# Patient Record
Sex: MALE | Race: WHITE
[De-identification: names, ages, dates, MRNs, and addresses within clinical notes are randomized per-mention and may not be internally consistent; named-entity substitution may affect disease eponyms.]

---

## 2020-06-14 ENCOUNTER — HOSPITAL ENCOUNTER (EMERGENCY)
Dept: HOSPITAL 72 - EMR | Age: 30
Discharge: HOME | End: 2020-06-14
Payer: COMMERCIAL

## 2020-06-14 VITALS — WEIGHT: 150 LBS | BODY MASS INDEX: 21.47 KG/M2 | HEIGHT: 70 IN

## 2020-06-14 VITALS — DIASTOLIC BLOOD PRESSURE: 68 MMHG | SYSTOLIC BLOOD PRESSURE: 106 MMHG

## 2020-06-14 DIAGNOSIS — S61.511A: Primary | ICD-10-CM
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
## 2020-06-14 DIAGNOSIS — Z23: ICD-10-CM

## 2020-06-14 PROCEDURE — 90715 TDAP VACCINE 7 YRS/> IM: CPT

## 2020-06-14 PROCEDURE — 90471 IMMUNIZATION ADMIN: CPT

## 2020-06-14 PROCEDURE — 99283 EMERGENCY DEPT VISIT LOW MDM: CPT

## 2020-06-14 NOTE — NUR
ER DISCHARGE NOTE:

Patient is cleared to be discharged home per ERMD, pt is aox4, 99% on room air, 
with stable vital signs. pt was given dc and prescription instructions, pt was 
able to verbalize understanding, pt id band removed. pt is able to ambulate 
with steady gait. pt took all belongings.

## 2020-06-14 NOTE — NUR
ED Nurse Note:



All medications administered, pt tolerated well. NO ss of distress noted. Wound 
cleaned and bandaged. Will continue to monitor.

## 2020-06-14 NOTE — EMERGENCY ROOM REPORT
History of Present Illness


General


Chief Complaint:  Laceration


Source:  Patient





Present Illness


HPI


Patient is a 29-year-old male denies any significant past medical history who 

presents to the ER complaining of laceration to his right wrist.  Patient is 

left-hand dominant.  He states that he was holding a mug that was cut and 

accidentally cut his wrist.  He states that it was not suicidal ideation or 

attempt.  He does not know when his last tetanus shot was.


Allergies:  


Coded Allergies:  


     No Known Allergies (Unverified , 6/14/20)





COVID-19 Screening


Contact w/high risk pt:  No


Recent Travel to affected area:  No


Experienced COVID-19 symptoms?:  No


COVID-19 Testing performed PTA:  No





Patient History


Reviewed Nursing Documentation:  PMH: Agreed; PSxH: Agreed





Review of Systems


All Other Systems:  negative except mentioned in HPI





Physical Exam





Vital Signs








  Date Time  Temp Pulse Resp B/P (MAP) Pulse Ox O2 Delivery O2 Flow Rate FiO2


 


6/14/20 22:12 98.2 68 16 106/68 (81) 97 Room Air  








Sp02 EP Interpretation:  reviewed, normal


General Appearance:  no apparent distress, alert, GCS 15, non-toxic


Head:  normocephalic, atraumatic


Eyes:  bilateral eye normal inspection, bilateral eye PERRL


ENT:  hearing grossly normal, EOM grossly intact, normal pharynx, no angioedema


Neck:  normal inspection, full range of motion


Respiratory:  chest non-tender, lungs clear


Cardiovascular #1:  regular rate, rhythm


Cardiovascular #2:  2+ radial (R), 2+ radial (L)


Rectal:  deferred


Musculoskeletal:  normal range of motion


Neurologic:  CNs III-XII nml as tested, oriented x3


Psychiatric:  no suicidal/homicidal ideation


Skin:  other - Linear laceration approximately 8 cm in length distal 3 cm cut 

through the dermis 5 cm superficial no tendon involvement through the dorsal 

surface of right wrist


Lymphatic:  no adenopathy





Procedures


Laceration/Wound Repair


Laceration/Wound Repair :  


   Consent:  Verbal


   Wound Location:  upper extremity - R wrist


   Wound's Depth, Shape:  superficial, linear


   Wound Length (cm):  8


   Wound Explored:  clean


   Irrigated w/ Saline (ccs):  300


   Betadine Prep?:  No


   Anesthesia:  1% Lidocaine


   Volume Anesthetic (ccs):  2


   Wound Debrided:  None


   Wound Repaired With:  staples


   Number of Sutures:  4


   Layer Closure?:  No


   Sterile Dressing Applied?:  Yes


   Sling Applied?:  No


   Complications:  Other - Patient became lightheaded and momentarily had a 

syncopal episode during laceration repair.  No trauma.  He is now awake alert 

and oriented.





Medical Decision Making


Diagnostic Impression:  


 Primary Impression:  


 Laceration


ER Course


After discussing risks and benefits of further diagnostics, treatment plans, as 

well as indications for and risks of admission, the patient is agreeable to 

being discharged home.  I have explained that their evaluation and treatment in 

the emergency department today is an important step towards them achieving 

better health but that their evaluation today is not intended to replace 

further evaluation and treatment by a physician in their local clinic.  I have 

explained that while the current findings suggest no immediate life threatening 

emergency they will require further evaluation and treatment by a physician of 

their choice in their area.  They understand that it will be necessary for them 

to review the final reports of their ED visit with their clinic physician.  We 

have reviewed indications for return to the Emergency Department.  I have 

explained that additional time may need to pass and/or additional testing as an 

outpatient may be necessary before a definitive diagnosis can be made.  They 

tell me they are willing to follow up as instructed within the timeframe I 

recommend.  They appear to understand what we discussed.  Additionally they 

understand that if they are unable to be seen by an outpatient physician they 

are welcome, and in fact should, return to the Emergency Department for a 

repeat evaluation.  The patient is stable at time of discharge.





Last Vital Signs








  Date Time  Temp Pulse Resp B/P (MAP) Pulse Ox O2 Delivery O2 Flow Rate FiO2


 


6/14/20 22:12 98.2 68 16 106/68 (81) 97 Room Air  








Disposition:  HOME, SELF-CARE


Condition:  Stable


Scripts


Bacitracin Zinc* (BACITRACIN ZINC*) 1 Each Packet


1 APPLIC TOPIC THREE TIMES A DAY for 7 Days, PACKET


   Prov: Ashlee Pryor M.D.         6/14/20


Referrals:  


[facility]











[facility]


Patient Instructions:  Laceration Care, Adult, Easy-to-Read





Additional Instructions:  


Return to the emergency room in 2 days for wound check.  Return to the ER in 8 

days for staple removal.





The patient was provided with discharge instructions, notified to follow-up 

with a primary care doctor and or specialist in the next 24-48 hours, and to 

return to the ED if they have worsening of their symptoms. 





Please note that this report is being documented using DRAGON technology.


This can lead to erroneous entry secondary to incorrect interpretation by the 

dictating instrument.











Ashlee Pryor M.D. Jun 14, 2020 22:31

## 2020-06-14 NOTE — NUR
ED Nurse Note:



pt ambulated into ed from home co laceration on right wrist d/t breaking a mug 
at home while drying dishes. Pt vss no ss of distress noted. pt aao x , 
ambulatory with steady gait. ERMD at bedside. Wound irrigated with sterile 
water.